# Patient Record
Sex: FEMALE | Race: BLACK OR AFRICAN AMERICAN
[De-identification: names, ages, dates, MRNs, and addresses within clinical notes are randomized per-mention and may not be internally consistent; named-entity substitution may affect disease eponyms.]

---

## 2018-09-20 ENCOUNTER — HOSPITAL ENCOUNTER (EMERGENCY)
Dept: HOSPITAL 74 - JERFT | Age: 58
Discharge: HOME | End: 2018-09-20
Payer: SELF-PAY

## 2018-09-20 VITALS — DIASTOLIC BLOOD PRESSURE: 80 MMHG | TEMPERATURE: 97.9 F | HEART RATE: 90 BPM | SYSTOLIC BLOOD PRESSURE: 156 MMHG

## 2018-09-20 VITALS — BODY MASS INDEX: 36 KG/M2

## 2018-09-20 DIAGNOSIS — E78.5: ICD-10-CM

## 2018-09-20 DIAGNOSIS — G89.29: ICD-10-CM

## 2018-09-20 DIAGNOSIS — Z76.0: Primary | ICD-10-CM

## 2018-09-20 DIAGNOSIS — E11.9: ICD-10-CM

## 2018-09-20 DIAGNOSIS — I10: ICD-10-CM

## 2018-09-20 DIAGNOSIS — Z79.4: ICD-10-CM

## 2018-09-20 NOTE — PDOC
History of Present Illness





- General


Chief Complaint: RX Refill


Stated Complaint: RX REFILL


Time Seen by Provider: 09/20/18 11:32


History Source: Patient


Exam Limitations: No Limitations





- History of Present Illness


Initial Comments: 


Patient is a 58-year-old female who has a history of type 2 diabetes, insulin-

dependent, hypercholesterolemia and hypertension who states that she lost her 

Medicaid and has been out of her medications for 2 days.  Patient is requesting 

a 30 day refill until her Medicaid is reinstated.  Patient is currently 

symptomatic at present.


09/20/18 11:51








Past History





- Travel


Traveled outside of the country in the last 30 days: No


Close contact w/someone who was outside of country & ill: No





- Past Medical History


Allergies/Adverse Reactions: 


 Allergies











Allergy/AdvReac Type Severity Reaction Status Date / Time


 


Penicillins Allergy   Verified 09/20/18 11:26











Home Medications: 


Ambulatory Orders





Amlodipine Besylate [Norvasc -] 5 mg PO DAILY 30 Days #30 tablet 09/20/18 


Atorvastatin Calcium [Lipitor] 10 mg PO HS 30 Days #30 tablet 09/20/18 


Gabapentin [Neurontin] 600 mg PO BID 30 Days #60 tablet 09/20/18 


Glipizide [Glucotrol] 10 mg PO BID 30 Days #60 tablet 09/20/18 


Hydrochlorothiazide [Hctz -] 12.5 mg PO DAILY 30 Days #30 cap 09/20/18 


Ibuprofen [Motrin -] 600 mg PO QID 30 Days #90 tablet 09/20/18 


Liraglutide [Victoza -] 1.2 mg SQ DAILY@0700 30 Days #60 pen.injctr 09/20/18 


Losartan Potassium [Cozaar] 100 mg PO DAILY 30 Days #30 tablet 09/20/18 


Metformin HCl [Glucophage] 1,000 mg PO BID 30 Days #60 tablet 09/20/18 








CVA: Yes (Lt residual)


COPD: No


Diabetes: Yes


HTN: Yes


Hypercholesterolemia: Yes


Other medical history: chronic back pain





- Suicide/Smoking/Psychosocial Hx


Smoking History: Never smoked





**Review of Systems





- Review of Systems


Able to Perform ROS?: Yes


All Other Systems: Reviewed and Negative





*Physical Exam





- Vital Signs


 Last Vital Signs











Temp Pulse Resp BP Pulse Ox


 


 97.9 F   90   18   156/80   97 


 


 09/20/18 11:26  09/20/18 11:26  09/20/18 11:26  09/20/18 11:26  09/20/18 11:26














- Physical Exam


Comments: 


Constitutional: VS stated, pt appears in no apparent distress; 


Skin: Warm and dry. Intact, no lesions or excoriations. 


Head: Normocephalic; atraumatic


Eyes: conjunctiva pink without injection or discharge.


Lungs:  Bilateral breath sounds clear upon auscultation. No adventitious breath 

sounds.


Heart:  Regular rate and rhythm,  S1/S2 auscultated. No murmurs, rubs, or 

gallops. No visible pulsations, heaves, or lifts on precordium. 


Musculoskeletal: Moves all extremities without difficulty.


Neurologic: Awake, alert. Conversation fluent. 


Psychiatric: Appropriate affect.


09/20/18 11:52








*DC/Admit/Observation/Transfer


Diagnosis at time of Disposition: 


 History of diabetes mellitus, type II, History of hypertension








- Discharge Dispostion


Disposition: HOME


Condition at time of disposition: Stable


Decision to Admit order: No





- Prescriptions


Prescriptions: 


Amlodipine Besylate [Norvasc -] 5 mg PO DAILY 30 Days #30 tablet


Atorvastatin Calcium [Lipitor] 10 mg PO HS 30 Days #30 tablet


Gabapentin [Neurontin] 600 mg PO BID 30 Days #60 tablet


Glipizide [Glucotrol] 10 mg PO BID 30 Days #60 tablet


Hydrochlorothiazide [Hctz -] 12.5 mg PO DAILY 30 Days #30 cap


Ibuprofen [Motrin -] 600 mg PO QID 30 Days #90 tablet


Liraglutide [Victoza -] 1.2 mg SQ DAILY@0700 30 Days #60 pen.injctr


Losartan Potassium [Cozaar] 100 mg PO DAILY 30 Days #30 tablet


Metformin HCl [Glucophage] 1,000 mg PO BID 30 Days #60 tablet





- Referrals


Referrals: 


Angel Manrique MD [Primary Care Provider] - 





- Patient Instructions


Printed Discharge Instructions:  Type 2 Diabetes


Additional Instructions: 


Take medications as prescribed.  Follow-up with your primary care physician for 

future refills.





- Post Discharge Activity

## 2019-03-24 ENCOUNTER — HOSPITAL ENCOUNTER (EMERGENCY)
Dept: HOSPITAL 74 - JERFT | Age: 59
Discharge: HOME | End: 2019-03-24
Payer: COMMERCIAL

## 2019-03-24 VITALS — TEMPERATURE: 98.3 F | DIASTOLIC BLOOD PRESSURE: 90 MMHG | SYSTOLIC BLOOD PRESSURE: 133 MMHG | HEART RATE: 114 BPM

## 2019-03-24 VITALS — BODY MASS INDEX: 36 KG/M2

## 2019-03-24 DIAGNOSIS — I69.854: ICD-10-CM

## 2019-03-24 DIAGNOSIS — N30.00: Primary | ICD-10-CM

## 2019-03-24 DIAGNOSIS — E78.00: ICD-10-CM

## 2019-03-24 DIAGNOSIS — E11.9: ICD-10-CM

## 2019-03-24 DIAGNOSIS — I10: ICD-10-CM

## 2019-03-24 DIAGNOSIS — Z79.84: ICD-10-CM

## 2019-03-24 LAB
APPEARANCE UR: (no result)
BACTERIA #/AREA URNS HPF: (no result) /HPF
BILIRUB UR STRIP.AUTO-MCNC: (no result) MG/DL
CASTS #/AREA URNS LPF: 4369.34 /HPF (ref 0–8)
COLOR UR: (no result)
EPITH CASTS URNS QL MICRO: 8.7 /HPF (ref 0–5)
KETONES UR QL STRIP: (no result)
LEUKOCYTE ESTERASE UR QL STRIP.AUTO: (no result)
NITRITE UR QL STRIP: POSITIVE
PH UR: 6 [PH] (ref 5–8)
PROT UR QL STRIP: (no result)
PROT UR QL STRIP: NEGATIVE
RBC # BLD AUTO: 161 /HPF (ref 0–4)
SP GR UR: 1.02 (ref 1.01–1.03)
UROBILINOGEN UR STRIP-MCNC: 1 MG/DL (ref 0.2–1)
WBC # UR AUTO: 3479.4 /HPF (ref 0–5)
YEAST #/AREA URNS HPF: (no result) /[HPF]

## 2019-03-24 NOTE — PDOC
History of Present Illness





- General


Chief Complaint: Urinary Problem


Stated Complaint: R/O UTI


Time Seen by Provider: 03/24/19 09:16


History Source: Patient


Exam Limitations: No Limitations





Past History





- Past Medical History


Allergies/Adverse Reactions: 


 Allergies











Allergy/AdvReac Type Severity Reaction Status Date / Time


 


Penicillins Allergy   Verified 03/24/19 08:57











Home Medications: 


Ambulatory Orders





Amlodipine Besylate [Norvasc -] 5 mg PO DAILY 30 Days #30 tablet 09/20/18 


Atorvastatin Calcium [Lipitor] 10 mg PO HS 30 Days #30 tablet 09/20/18 


Gabapentin [Neurontin] 600 mg PO BID 30 Days #60 tablet 09/20/18 


Glipizide [Glucotrol] 10 mg PO BID 30 Days #60 tablet 09/20/18 


Hydrochlorothiazide [Hctz -] 12.5 mg PO DAILY 30 Days #30 cap 09/20/18 


Ibuprofen [Motrin -] 600 mg PO QID 30 Days #90 tablet 09/20/18 


Liraglutide [Victoza -] 1.2 mg SQ DAILY@0700 30 Days #60 pen.injctr 09/20/18 


Losartan Potassium [Cozaar] 100 mg PO DAILY 30 Days #30 tablet 09/20/18 


Metformin HCl [Glucophage] 1,000 mg PO BID 30 Days #60 tablet 09/20/18 


Nitrofurantoin Monohyd/M-Cryst [Macrobid -] 100 mg PO BID #14 capsule 03/24/19 








CVA: Yes (Lt residual)


COPD: No


Diabetes: Yes


HTN: Yes


Hypercholesterolemia: Yes





- Suicide/Smoking/Psychosocial Hx


Smoking History: Never smoked





*Physical Exam





- Vital Signs


 Last Vital Signs











Temp Pulse Resp BP Pulse Ox


 


 98.3 F   114 H  20   133/90   99 


 


 03/24/19 08:52  03/24/19 08:52  03/24/19 08:52  03/24/19 08:52  03/24/19 08:52














- Physical Exam


General Appearance: No: Apparent Distress


Respiratory/Chest: positive: Lungs Clear, Normal Breath Sounds.  negative: 

Respiratory Distress


Cardiovascular: positive: Regular Rhythm, Regular Rate, S1, S2.  negative: 

Murmur


Gastrointestinal/Abdominal: positive: Normal Bowel Sounds, Soft.  negative: 

Tender, Distended, Guarding, Rebound


Musculoskeletal: negative: CVA Tenderness


Neurologic: positive: Fully Oriented, Alert, Normal Mood/Affect





Moderate Sedation





- Procedure Monitoring


Vital Signs: 


Procedure Monitoring Vital Signs











Temperature  98.3 F   03/24/19 08:52


 


Pulse Rate  114 H  03/24/19 08:52


 


Respiratory Rate  20   03/24/19 08:52


 


Blood Pressure  133/90   03/24/19 08:52


 


O2 Sat by Pulse Oximetry (%)  99   03/24/19 08:52











Medical Decision Making





- Medical Decision Making








59 y/o F with hx of HTN and T2DM presents with dysuria, increased urinary 

frequency and urgency since 2 days ago. States was taking ?OTC medication 2 

days ago (took it for 2 days), which changed color of her urine. Denies fever, 

flank pain, sob, cp, abd pain, n/v/d.





Likely UTI


UA pending, but patient does not want to wait for results


Given patient is symptomatic, will treat


UCx sent





03/24/19 10:51








*DC/Admit/Observation/Transfer


Diagnosis at time of Disposition: 


UTI (urinary tract infection)


Qualifiers:


 Urinary tract infection type: acute cystitis Hematuria presence: without 

hematuria Qualified Code(s): N30.00 - Acute cystitis without hematuria








- Discharge Dispostion


Disposition: HOME


Condition at time of disposition: Stable


Decision to Admit order: No





- Prescriptions


Prescriptions: 


Nitrofurantoin Monohyd/M-Cryst [Macrobid -] 100 mg PO BID #14 capsule





- Referrals


Referrals: 


Angel Manrique MD [Primary Care Provider] - 3 days





- Patient Instructions


Printed Discharge Instructions:  DI for Urinary Tract Infection (UTI)


Additional Instructions: 





Thank you for choosing [facility].  It was a pleasure taking 

care of you.  





You were sent prescription for likely urine infection - Macrobid


Please take as advised


Be sure to drink plenty of water - at least 2 L daily


Follow-up with primary care doctor for further evaluation





Return to the Emergency Department if your symptoms worsen or persist or have 

other concerning symptoms. 





- Post Discharge Activity

## 2019-04-15 ENCOUNTER — HOSPITAL ENCOUNTER (EMERGENCY)
Dept: HOSPITAL 74 - JERFT | Age: 59
Discharge: HOME | End: 2019-04-15
Payer: COMMERCIAL

## 2019-04-15 VITALS — BODY MASS INDEX: 37.5 KG/M2

## 2019-04-15 VITALS — TEMPERATURE: 98.5 F | SYSTOLIC BLOOD PRESSURE: 159 MMHG | DIASTOLIC BLOOD PRESSURE: 84 MMHG | HEART RATE: 93 BPM

## 2019-04-15 DIAGNOSIS — T31.0: ICD-10-CM
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
## 2019-04-15 DIAGNOSIS — T21.31XA: Primary | ICD-10-CM

## 2019-04-15 PROCEDURE — 0HBT0ZZ EXCISION OF RIGHT BREAST, OPEN APPROACH: ICD-10-PCS

## 2019-04-15 PROCEDURE — 3E0234Z INTRODUCTION OF SERUM, TOXOID AND VACCINE INTO MUSCLE, PERCUTANEOUS APPROACH: ICD-10-PCS
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
## 2020-03-29 ENCOUNTER — HOSPITAL ENCOUNTER (EMERGENCY)
Dept: HOSPITAL 74 - JER | Age: 60
Discharge: HOME | End: 2020-03-29
Payer: COMMERCIAL

## 2020-03-29 VITALS — TEMPERATURE: 101.9 F | HEART RATE: 119 BPM | SYSTOLIC BLOOD PRESSURE: 121 MMHG | DIASTOLIC BLOOD PRESSURE: 67 MMHG

## 2020-03-29 DIAGNOSIS — B34.9: Primary | ICD-10-CM

## 2020-03-29 PROCEDURE — U0002 COVID-19 LAB TEST NON-CDC: HCPCS

## 2021-05-01 ENCOUNTER — HOSPITAL ENCOUNTER (EMERGENCY)
Dept: HOSPITAL 74 - JER | Age: 61
Discharge: HOME | End: 2021-05-01
Payer: COMMERCIAL

## 2021-05-01 VITALS — TEMPERATURE: 97.5 F | HEART RATE: 93 BPM | DIASTOLIC BLOOD PRESSURE: 82 MMHG | SYSTOLIC BLOOD PRESSURE: 145 MMHG

## 2021-05-01 VITALS — BODY MASS INDEX: 36.1 KG/M2

## 2021-05-01 DIAGNOSIS — N30.01: Primary | ICD-10-CM

## 2021-05-01 LAB
APPEARANCE UR: (no result)
BACTERIA # UR AUTO: (no result) /UL (ref 0–1359)
BILIRUB UR STRIP.AUTO-MCNC: NEGATIVE MG/DL
COLOR UR: YELLOW
EPITH CASTS URNS QL MICRO: 19.3 /UL (ref 0–25.1)
KETONES UR QL STRIP: (no result)
LEUKOCYTE ESTERASE UR QL STRIP.AUTO: (no result)
NITRITE UR QL STRIP: NEGATIVE
PH UR: 5.5 [PH] (ref 5–8)
PROT UR QL STRIP: (no result)
PROT UR QL STRIP: NEGATIVE
RBC # BLD AUTO: 743.1 /UL (ref 0–23.9)
SP GR UR: 1.02 (ref 1.01–1.03)
UROBILINOGEN UR STRIP-MCNC: 0.2 MG/DL (ref 0.2–1)
WBC # UR AUTO: (no result) /UL (ref 0–25.8)

## 2024-10-31 ENCOUNTER — OFFICE (OUTPATIENT)
Dept: URBAN - METROPOLITAN AREA CLINIC 35 | Facility: CLINIC | Age: 64
Setting detail: OPHTHALMOLOGY
End: 2024-10-31
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
## 2024-10-31 PROCEDURE — NO SHOW FE NO SHOW FEE: Performed by: OPHTHALMOLOGY
